# Patient Record
Sex: FEMALE | Race: WHITE | NOT HISPANIC OR LATINO | Employment: UNEMPLOYED | URBAN - NONMETROPOLITAN AREA
[De-identification: names, ages, dates, MRNs, and addresses within clinical notes are randomized per-mention and may not be internally consistent; named-entity substitution may affect disease eponyms.]

---

## 2022-06-29 ENCOUNTER — TRANSCRIBE ORDERS (OUTPATIENT)
Dept: PHYSICAL THERAPY | Facility: HOSPITAL | Age: 32
End: 2022-06-29

## 2022-06-29 DIAGNOSIS — M54.9 BACK PAIN, UNSPECIFIED BACK LOCATION, UNSPECIFIED BACK PAIN LATERALITY, UNSPECIFIED CHRONICITY: Primary | ICD-10-CM

## 2022-07-13 ENCOUNTER — HOSPITAL ENCOUNTER (OUTPATIENT)
Dept: PHYSICAL THERAPY | Facility: HOSPITAL | Age: 32
Setting detail: THERAPIES SERIES
Discharge: HOME | End: 2022-07-13

## 2022-07-13 DIAGNOSIS — M54.50 LUMBAR BACK PAIN: Primary | ICD-10-CM

## 2022-07-13 DIAGNOSIS — G89.29 CHRONIC LOW BACK PAIN WITHOUT SCIATICA, UNSPECIFIED BACK PAIN LATERALITY: ICD-10-CM

## 2022-07-13 DIAGNOSIS — M54.50 CHRONIC LOW BACK PAIN WITHOUT SCIATICA, UNSPECIFIED BACK PAIN LATERALITY: ICD-10-CM

## 2022-07-13 PROCEDURE — L3332 SHOE LIFTS TAPERED TO ONE-HA: HCPCS | Performed by: PHYSICAL THERAPIST

## 2022-07-13 PROCEDURE — 97162 PT EVAL MOD COMPLEX 30 MIN: CPT | Performed by: PHYSICAL THERAPIST

## 2022-07-13 NOTE — THERAPY EVALUATION
"    Outpatient Physical Therapy Ortho Initial Evaluation  [facility]     Patient Name: Mela Rodrigues  : 1990  MRN: [id number]  Today's Date: 2022      Visit Date: 2022    Patient seen for 1 PT sessions.  Patient reports N/A% of improvement.  Next MD appt: GABE  Recertification: 2022.    Therapy Diagnosis: Chronic LBP         Past Medical History:   Diagnosis Date   • Anxiety    • Asthma    • Diabetes mellitus (HCC)    • Hypertension         History reviewed. No pertinent surgical history.    Visit Dx:     ICD-10-CM ICD-9-CM   1. Lumbar back pain  M54.50 724.2   2. Chronic low back pain without sciatica, unspecified back pain laterality  M54.50 724.2    G89.29 338.29          Patient History     Row Name 22 0800             History    Chief Complaint Pain  -AJ      Type of Pain Back pain  -AJ      Date Current Problem(s) Began --  Chronic, years  -AJ      Brief Description of Current Complaint Patient reprots she saw a neurologist and had an MRI. She reprots she was told she has DD and 2 discs are \"completely destroyed\". Patient reprots her back has been worsening. She reprots the neurologist was recommendeding surgery but insurance won't approve until she has had PT.  -AJ      Previous treatment for THIS PROBLEM Injections  -AJ      Patient/Caregiver Goals Relieve pain;Know what to do to help the symptoms  -AJ      Current Tobacco Use None  -AJ      Smoking Status Former smoker  -AJ      Patient's Rating of General Health Fair  -AJ      Occupation/sports/leisure activities Occupation:unemployed; Hobbies: hiking, fishing, animals  -AJ      Patient seeing anyone else for problem(s)? Neurologist, Ortho  -AJ      What clinical tests have you had for this problem? MRI;X-ray  -AJ      History of Previous Related Injuries None that patient can recall.  -AJ              Pain     Pain Location Back  -AJ      Pain at Present 4  -AJ      Pain at Best 2  -AJ      Pain at Worst 9  -AJ      " Pain Frequency Constant/continuous  -AJ      Pain Description Throbbing;Sharp;Burning;Aching;Shooting  -AJ      What Performance Factors Make the Current Problem(s) WORSE? prolonged positions  -AJ      What Performance Factors Make the Current Problem(s) BETTER? muscle relaxants  -AJ      Tolerance Time- Standing 10 minutes  -AJ      Tolerance Time- Sitting 30 minutes  -AJ      Is your sleep disturbed? Yes  sometimes  -AJ      Is medication used to assist with sleep? Yes  muscle relaxant  -AJ            User Key  (r) = Recorded By, (t) = Taken By, (c) = Cosigned By    Initials Name Provider Type    AJ Concepcion Peña, PT DPT Physical Therapist                 PT Ortho     Row Name 07/13/22 0800       Subjective Comments    Subjective Comments see history  -AJ       Subjective Pain    Able to rate subjective pain? yes  -AJ    Pre-Treatment Pain Level 4  -AJ    Post-Treatment Pain Level 4  -AJ       Posture/Observations    Alignment Options Forward head;Cervical lordosis;Thoracic kyphosis;Rounded shoulders;Scoliosis;Lumbar lordosis;Iliac crests  -AJ    Forward Head Mild;Moderate;Increased  -AJ    Cervical Lordosis Mild;Moderate;Increased  -AJ    Thoracic Kyphosis Mild;Moderate;Increased  -AJ    Rounded Shoulders Bilateral:;Mild;Moderate;Increased  -AJ    Scoliosis Normal  -AJ    Lumbar lordosis Bilateral:;Mild;Decreased  flattening  -AJ    Iliac crests Bilateral:;Normal  pelvis is level, but LLD discovered with R LE being 1cm short.  -AJ    Posture/Observations Comments No distress. very poor overall standing and sitting posture. Locks knees into hyperextension in standing.  -AJ       Sensory Screen for Light Touch- Lower Quarter Clearing    L1 (inguinal area) Bilateral:;Intact  -AJ    L2 (anterior mid thigh) Bilateral:;Intact  -AJ    L3 (distal anterior thigh) Bilateral:;Intact  -AJ    L4 (medial lower leg/foot) Bilateral:;Intact  -AJ    L5 (lateral lower leg/great toe) Bilateral:;Intact  -AJ    S1 (bottom of  foot) Bilateral:;Intact  -AJ       Lumbar/SI Special Tests    Standing Flexion Test (SI Dysfunction) Bilateral:;Negative  -AJ    Stork Test (SI Dysfunction) Bilateral:;Negative  -AJ    Trendelenburg Test (Gluteus Medius Weakness) Bilateral:;Negative  -AJ    Slump Test (Neural Tension) Bilateral:;Negative  -AJ    SLR (Neural Tension) Bilateral:;Negative  -AJ    SI Compression Test (SI Dysfunction) Bilateral:;Negative  -AJ    SI Distraction Test (SI Dysfunction) Bilateral:;Negative  -AJ    BRYANT (hip vs. SI Dysfunction) Bilateral:;Negative  -AJ    FAIR Test (Piriformis Syndrome) Bilateral:;Negative  -AJ       Millicent's Signs    Superficial and non-anatomical tenderness Negative  -AJ    Simulation test Positive  -AJ    Distraction straight leg raise test (sitting vs supine) Positive  -AJ    Regional disturbances Positive  -AJ    Overreaction to examination Negative  -AJ       Head/Neck/Trunk    Trunk Extension AROM 8°  -AJ    Trunk Flexion AROM 65°  -AJ    Trunk Lt Lateral Flexion AROM 100% of range, WNL  -AJ    Trunk Rt Lateral Flexion AROM 100% of range, WNL  -AJ    Trunk Lt Rotation AROM 50% of range  -AJ    Trunk Rt Rotation AROM 50% of range  -AJ       MMT (Manual Muscle Testing)    General MMT Comments B LE 5/5, except B hip flexion 4/5, cogwheeling with R hip flexion and R QS testing  -AJ       Sensation    Sensation WNL? WNL  -AJ    Light Touch No apparent deficits  -AJ    Additional Comments Denies any numbness or tingling  -AJ       Lower Extremity Flexibility    Hamstrings Bilateral:;Moderately limited  -AJ    Hip Flexors Bilateral:;Mildly limited  -AJ    LE Other Flexibility Bilateral:;Mildly limited  piriformis  -AJ       Pathomechanics    Spine Pathomechanics Excessive thoracic kyphosis with forward bend;Limited lumbar flattening with forward bend  -AJ       Transfers    Comment, (Transfers) I with all transfers, but absent log roll technique.  -AJ       Gait/Stairs (Locomotion)    Comment, (Gait/Stairs)  "FWB, non-antalgic gait, no assistive device, no significant deviations noted, normal arm swing with gait.  -          User Key  (r) = Recorded By, (t) = Taken By, (c) = Cosigned By    Initials Name Provider Type    Concepcion Carpio, PT DPT Physical Therapist                            Therapy Education  Education Details: HEP: all exercises given today  Given: HEP, Symptoms/condition management, Pain management, Posture/body mechanics, Mobility training (POC)  Program: New  How Provided: Verbal, Demonstration, Written  Provided to: Patient  Level of Understanding: Demonstrated, Verbalized, Teach back education performed      PT OP Goals     Row Name 07/13/22 0800          PT Short Term Goals    STG 1 I with HEP and have additions/changes by next recertification  -     STG 2 Patient able to show proper log roll technique.  -     STG 3 Patient to be more aware of posture and posture correction techniques  -     STG 4 AROM B Lumbar ROT >= 75% of range  -     STG 5 AROM lumbar extension >= 20°.  -     STG 6 B hip flexion >= 4+/5  -            Long Term Goals    LTG 1 Patient to have AROM for the lumbar spine all WNL, no increase in pain.  -     LTG 2 B LE 5/5 with no increase in pain.  -     LTG 3 Patient able to perform 20 Bridges with UE \"X\" over Pball with no increase in pain.  -     LTG 4 Patient able to control hyperextension present in B knees  -     LTG 5 Patient able to show proper lifting technique floor to waist with no increase in pain.  -     LTG 6 Patient able to show proper ergonomics for mopping/sweeping/vacuuming.  -     LTG 7 I with final HEP.  -            Time Calculation    PT Goal Re-Cert Due Date 08/03/22  -           User Key  (r) = Recorded By, (t) = Taken By, (c) = Cosigned By    Initials Name Provider Type    Concepcion Carpio, PT DPT Physical Therapist              Barriers to Rehab: Include significant or possible arthritic/degenerative changes " that have occurred within the spine, The chronicity of this issue, The patient's obesity.    Safety Issues: None noted.        PT Assessment/Plan     Row Name 07/13/22 0800          PT Assessment    Functional Limitations Limitation in home management;Limitations in community activities;Performance in leisure activities;Performance in self-care ADL  -AJ     Impairments Endurance;Impaired flexibility;Impaired muscle endurance;Impaired muscle length;Impaired muscle power;Impaired postural alignment;Joint integrity;Joint mobility;Muscle strength;Pain;Poor body mechanics;Posture;Range of motion  -     Assessment Comments Patient is an overweight 32yo female who presents to the clinci today with complaints of low back pain worsening over the yeats. She reports surgery was recommended due to DDD but Pt was required first. She has a LLD that was discovred as well as significant tightness in trunk and LEs with B knee hyperextension. She was fit with a heel wedge for LLD. Patient's insurance does not allow treatment to begin on the initial evaluation. Small HEP was established.    Skilled PT with address deficits listed.  -AJ     Please refer to paper survey for additional self-reported information Yes  -AJ     Rehab Potential Fair  -AJ     Patient/caregiver participated in establishment of treatment plan and goals Yes  -AJ     Patient would benefit from skilled therapy intervention Yes  -AJ            PT Plan    PT Frequency 1x/week  per patient request  -AJ     Predicted Duration of Therapy Intervention (PT) 6-8 visits  -AJ     Planned CPT's? PT EVAL MOD COMPLELITY: 57710;PT RE-EVAL: 79326;PT THER PROC EA 15 MIN: 42840;PT THER ACT EA 15 MIN: 34968;PT MANUAL THERAPY EA 15 MIN: 53782;PT NEUROMUSC RE-EDUCATION EA 15 MIN: 70159;PT SELF CARE/HOME MGMT/TRAIN EA 15: 61328;PT HOT OR COLD PACK TREAT [facility];PT GAIT TRAINING EA 15 MIN: 90832;PT THER SUPP EA 15 MIN  -AJ     PT Plan Comments progress overall ROM, strength, posture,  "core stab, endurance, spinal protection, and ergonomics training.  -           User Key  (r) = Recorded By, (t) = Taken By, (c) = Cosigned By    Initials Name Provider Type    Concepcion Carpio PT DPT Physical Therapist            Other therapeutic activities and/or exercises will be prescribed depending on the patient's progress or lack thereof.         OP Exercises     Row Name 07/13/22 0800             Subjective Comments    Subjective Comments see history  -AJ              Subjective Pain    Able to rate subjective pain? yes  -AJ      Pre-Treatment Pain Level 4  -AJ      Post-Treatment Pain Level 4  -AJ              Exercise 1    Exercise Name 1 Fit with heel lift for LLD  -      Additional Comments Size medium  -              Exercise 2    Exercise Name 2 B St. HS S  -AJ      Reps 2 2  -AJ      Time 2 30 seconds  -AJ              Exercise 3    Exercise Name 3 LTR  -AJ      Reps 3 10  -AJ      Time 3 10\" hold  -            User Key  (r) = Recorded By, (t) = Taken By, (c) = Cosigned By    Initials Name Provider Type    Concepcion Carpio PT DPT Physical Therapist            All therapeutic interventions performed today were to address current functional limitations and/or deficits in addressing all physical therapy goals.                    Outcome Measure Options: Modified Oswestry  Modified Oswestry  Modified Oswestry Score/Comments: 19/50 = 38%      Time Calculation:     Start Time: 0820  Stop Time: 0900  Time Calculation (min): 40 min     Therapy Charges for Today     Code Description Service Date Service Provider Modifiers Qty    53384376469  PT THER SUPP EA 15 MIN 7/13/2022 Concepcion Peña, PT DPT GP 1    [id number]  PT EVAL MOD COMPLEXITY 3 7/13/2022 Concepcion Peña PT DPT GP 1    [id number]  LIFT HEEL ADJUST A LIFT 7/13/2022 Concepcion Peña PT DPT  1          PT G-Codes  Outcome Measure Options: Modified Oswestry  Modified Oswestry Score/Comments: 19/50 = " 38%       This document has been electronically signed by Concepcion Peña, PT DPT, CSCS on July 13, 2022 09:22 CDT

## 2022-07-20 ENCOUNTER — HOSPITAL ENCOUNTER (OUTPATIENT)
Dept: PHYSICAL THERAPY | Facility: HOSPITAL | Age: 32
Setting detail: THERAPIES SERIES
Discharge: HOME | End: 2022-07-20

## 2022-07-20 DIAGNOSIS — M54.50 LUMBAR BACK PAIN: Primary | ICD-10-CM

## 2022-07-20 DIAGNOSIS — M54.50 CHRONIC LOW BACK PAIN WITHOUT SCIATICA, UNSPECIFIED BACK PAIN LATERALITY: ICD-10-CM

## 2022-07-20 DIAGNOSIS — G89.29 CHRONIC LOW BACK PAIN WITHOUT SCIATICA, UNSPECIFIED BACK PAIN LATERALITY: ICD-10-CM

## 2022-07-20 PROCEDURE — 97110 THERAPEUTIC EXERCISES: CPT | Performed by: PHYSICAL THERAPIST

## 2022-07-20 NOTE — THERAPY TREATMENT NOTE
Outpatient Physical Therapy Ortho Treatment Note  [facility]     Patient Name: Mela Rodrigues  : 1990  MRN: [id number]  Today's Date: 2022      Visit Date: 2022     Patient seen for 2 PT sessions.  Patient reports N/A% of improvement.  Next MD appt: GABE  Recertification: 2022.     Therapy Diagnosis: Chronic LBP    Visit Dx:    ICD-10-CM ICD-9-CM   1. Lumbar back pain  M54.50 724.2   2. Chronic low back pain without sciatica, unspecified back pain laterality  M54.50 724.2    G89.29 338.29       There is no problem list on file for this patient.       Past Medical History:   Diagnosis Date   • Anxiety    • Asthma    • Diabetes mellitus (HCC)    • Hypertension         No past surgical history on file.     PT Ortho     Row Name 22 0800       Precautions and Contraindications    Precautions/Limitations no known precautions/limitations  -KG       Subjective Pain    Able to rate subjective pain? yes  -KG       Posture/Observations    Posture/Observations Comments No malalignment at pelvis. Cues/education for posture throughout.  -KG          User Key  (r) = Recorded By, (t) = Taken By, (c) = Cosigned By    Initials Name Provider Type    Meenu Naik, PT Physical Therapist                             PT Assessment/Plan     Row Name 22 08          PT Assessment    Assessment Comments Pt did reasonably well with treatment. Good recall of HEP. Notes that the heel lift given to her at initial eval has helped with her overall pain. Did well with new stretches. Needed cues for proper iso TA contraction but improved with increased reps/cues.  -KG            PT Plan    PT Frequency 1x/week  -KG     PT Plan Comments Continue stability progressions. Could try seated PN/posture education. seated no moneys next.  -KG           User Key  (r) = Recorded By, (t) = Taken By, (c) = Cosigned By    Initials Name Provider Type    Meenu Naik, PT Physical Therapist           "         OP Exercises     Row Name 07/20/22 0800             Subjective Comments    Subjective Comments Pt states the exercises are going fair. Has a little pain when she does them. Pt states the heel lift has helped the pain in her foot and back.  -KG              Subjective Pain    Able to rate subjective pain? yes  -KG      Pre-Treatment Pain Level 5  -KG      Post-Treatment Pain Level 5  -KG              Exercise 1    Exercise Name 1 Seated jim hamstring stretch  -KG      Cueing 1 Verbal  -KG      Reps 1 2  -KG      Time 1 30\" hold  -KG              Exercise 2    Exercise Name 2 Seated jim piriformis stretch  -KG      Cueing 2 Verbal  -KG      Reps 2 2  -KG      Time 2 30\" hold  -KG              Exercise 3    Exercise Name 3 Jim SKTC stretch  -KG      Cueing 3 Verbal  -KG      Reps 3 2  -KG      Time 3 30\" hold  -KG              Exercise 4    Exercise Name 4 HL jim piriformis stretch  -KG      Cueing 4 Verbal  -KG      Reps 4 2  -KG      Time 4 30\" hold  -KG              Exercise 5    Exercise Name 5 HL isometric TA/kegel education  -KG      Cueing 5 Verbal;Tactile  -KG      Time 5 3 min  -KG              Exercise 6    Exercise Name 6 HL iso TA wtih add squeeze  -KG      Cueing 6 Verbal  -KG      Sets 6 1  -KG      Reps 6 10  -KG      Time 6 5\" hold  -KG              Exercise 7    Exercise Name 7 HL hip abd with TA  -KG      Cueing 7 Verbal  -KG      Sets 7 2  -KG      Reps 7 10  -KG      Time 7 pause  -KG              Exercise 8    Exercise Name 8 Bridges with TA  -KG      Cueing 8 Verbal  -KG      Sets 8 1  -KG      Reps 8 10  -KG      Time 8 5\" hold  -KG              Exercise 9    Exercise Name 9 SL clamshells with TA  -KG      Cueing 9 Verbal  -KG      Sets 9 1  -KG      Reps 9 10 ea LE  -KG            User Key  (r) = Recorded By, (t) = Taken By, (c) = Cosigned By    Initials Name Provider Type    KG Meenu Matias, PT Physical Therapist                              PT OP Goals     Row Name 07/20/22 0800 " "         PT Short Term Goals    STG 1 I with HEP and have additions/changes by next recertification  -KG     STG 1 Progress Progressing  -KG     STG 2 Patient able to show proper log roll technique.  -KG     STG 2 Progress Progressing  -KG     STG 3 Patient to be more aware of posture and posture correction techniques  -KG     STG 3 Progress Progressing  -KG     STG 4 AROM B Lumbar ROT >= 75% of range  -KG     STG 4 Progress Progressing  -KG     STG 5 AROM lumbar extension >= 20°.  -KG     STG 5 Progress Progressing  -KG     STG 6 B hip flexion >= 4+/5  -KG     STG 6 Progress Progressing  -KG            Long Term Goals    LTG 1 Patient to have AROM for the lumbar spine all WNL, no increase in pain.  -KG     LTG 2 B LE 5/5 with no increase in pain.  -KG     LTG 3 Patient able to perform 20 Bridges with UE \"X\" over Pball with no increase in pain.  -KG     LTG 4 Patient able to control hyperextension present in B knees  -KG     LTG 5 Patient able to show proper lifting technique floor to waist with no increase in pain.  -KG     LTG 6 Patient able to show proper ergonomics for mopping/sweeping/vacuuming.  -KG     LTG 7 I with final HEP.  -KG            Time Calculation    PT Goal Re-Cert Due Date 08/03/22  -KG           User Key  (r) = Recorded By, (t) = Taken By, (c) = Cosigned By    Initials Name Provider Type    Meenu Naik, PT Physical Therapist                Therapy Education  Education Details: Added SKTC stretch, HL/seated piriformis stretch, & Iso TA/kegel  Given: HEP, Symptoms/condition management, Pain management, Posture/body mechanics  Program: Reinforced, Progressed  How Provided: Verbal, Demonstration, Written  Provided to: Patient  Level of Understanding: Teach back education performed, Verbalized, Demonstrated              Time Calculation:   Start Time: 0830  Stop Time: 0914  Time Calculation (min): 44 min  Therapy Charges for Today     Code Description Service Date Service Provider Modifiers " Qty    [id number]  PT THER PROC EA 15 MIN 7/20/2022 Meenu Matias, PT GP 3                    Meenu Matias, PT  7/20/2022

## 2022-07-27 ENCOUNTER — HOSPITAL ENCOUNTER (OUTPATIENT)
Dept: PHYSICAL THERAPY | Facility: HOSPITAL | Age: 32
Setting detail: THERAPIES SERIES
Discharge: HOME | End: 2022-07-27

## 2022-07-27 DIAGNOSIS — M54.50 CHRONIC LOW BACK PAIN WITHOUT SCIATICA, UNSPECIFIED BACK PAIN LATERALITY: ICD-10-CM

## 2022-07-27 DIAGNOSIS — M54.50 LUMBAR BACK PAIN: Primary | ICD-10-CM

## 2022-07-27 DIAGNOSIS — G89.29 CHRONIC LOW BACK PAIN WITHOUT SCIATICA, UNSPECIFIED BACK PAIN LATERALITY: ICD-10-CM

## 2022-07-27 PROCEDURE — 97110 THERAPEUTIC EXERCISES: CPT

## 2022-07-27 NOTE — THERAPY TREATMENT NOTE
Outpatient Physical Therapy Ortho Treatment Note  [facility]     Patient Name: Mela Rodrigues  : 1990  MRN: [id number]  Today's Date: 2022     Pt seen for 3 PT sessions  Reported Improvement:  N/A %  MD Visit: PRN  Recheck Date: 2022    Therapy Diagnosis:  Chronic LBP        Visit Date: 2022    Visit Dx:    ICD-10-CM ICD-9-CM   1. Lumbar back pain  M54.50 724.2   2. Chronic low back pain without sciatica, unspecified back pain laterality  M54.50 724.2    G89.29 338.29       There is no problem list on file for this patient.       Past Medical History:   Diagnosis Date   • Anxiety    • Asthma    • Diabetes mellitus (HCC)    • Hypertension         No past surgical history on file.     PT Ortho     Row Name 22 08       Subjective Comments    Subjective Comments States her back has been giving her a lot of trouble this week.  Been doing some of the HEP.  MM relaxers and Tylenol help the most.  -GARDENIA       Precautions and Contraindications    Precautions/Limitations no known precautions/limitations  -GARDENIA       Subjective Pain    Able to rate subjective pain? yes  -GARDENIA    Pre-Treatment Pain Level 3  -GARDENIA          User Key  (r) = Recorded By, (t) = Taken By, (c) = Cosigned By    Initials Name Provider Type    Rosemary Alston PTA Physical Therapist Assistant                             PT Assessment/Plan     Row Name 22 08          PT Assessment    Assessment Comments Pt with fair recall of HEP.  Min cues for posture at times but was able to self correct in sitting.  No reports of increased pain during tx session.  -            PT Plan    PT Frequency 1x/week  -     PT Plan Comments Next visit add seated PN hip flexion/LAQ  -GARDENIA           User Key  (r) = Recorded By, (t) = Taken By, (c) = Cosigned By    Initials Name Provider Type    Rosemary Alston PTA Physical Therapist Assistant                   OP Exercises     Row Name 22 08             Subjective Comments  "   Subjective Comments States her back has been giving her a lot of trouble this week.  Been doing some of the HEP.  MM relaxers and Tylenol help the most.  -JW              Subjective Pain    Able to rate subjective pain? yes  -JW      Pre-Treatment Pain Level 3  -JW      Post-Treatment Pain Level 3  -JW              Exercise 1    Exercise Name 1 Pro II LE bike for posture re ed, strength and endurance  -JW      Time 1 10 min  -JW      Additional Comments L 4.0  -JW              Exercise 2    Exercise Name 2 B st HS st  -JW      Reps 2 2  -JW      Time 2 30\"  -JW              Exercise 3    Exercise Name 3 B seated piriformis st  -JW      Reps 3 2  -JW      Time 3 30\" hold  -JW              Exercise 4    Exercise Name 4 Sit to stands with Lx  -JW      Cueing 4 Verbal;Demo  -JW      Reps 4 10  -JW      Time 4 5\" hold  -JW              Exercise 5    Exercise Name 5 Seated PN No Money's  -JW      Sets 5 2  -JW      Reps 5 10  -JW      Time 5 5\" hold  -JW      Additional Comments YTB  -JW              Exercise 6    Exercise Name 6 SKTC st  -JW      Sets 6 2  -JW      Reps 6 30\" hold bilat  -JW              Exercise 7    Exercise Name 7 HL Hip ISO Hip ADD with TA  -JW      Cueing 7 Verbal  -JW      Reps 7 20  -JW      Time 7 5\" hold  -JW              Exercise 8    Exercise Name 8 HL Hip ABD with TA  -JW      Reps 8 20  -JW      Time 8 5\" hold  -JW      Additional Comments RTB  -JW              Exercise 9    Exercise Name 9 BKLL with TA  -JW              Exercise 10    Exercise Name 10 Bridges with TA  -JW      Sets 10 1  -JW      Reps 10 10  -JW      Time 10 5\" hold  -JW              Exercise 11    Exercise Name 11 Log roll training  -JW            User Key  (r) = Recorded By, (t) = Taken By, (c) = Cosigned By    Initials Name Provider Type    Rosemary Alston, PTA Physical Therapist Assistant                              PT OP Goals     Row Name 07/27/22 0800          PT Short Term Goals    STG 1 I with HEP and have " "additions/changes by next recertification  -     STG 1 Progress Progressing  -     STG 2 Patient able to show proper log roll technique.  -     STG 2 Progress Progressing  -     STG 3 Patient to be more aware of posture and posture correction techniques  -     STG 3 Progress Progressing  -     STG 4 AROM B Lumbar ROT >= 75% of range  -     STG 4 Progress Progressing  -     STG 5 AROM lumbar extension >= 20°.  -     STG 5 Progress Progressing  -     STG 6 B hip flexion >= 4+/5  -     STG 6 Progress Progressing  -            Long Term Goals    LTG 1 Patient to have AROM for the lumbar spine all WNL, no increase in pain.  -     LTG 2 B LE 5/5 with no increase in pain.  -     LTG 3 Patient able to perform 20 Bridges with UE \"X\" over Pball with no increase in pain.  -     LTG 4 Patient able to control hyperextension present in B knees  -     LTG 5 Patient able to show proper lifting technique floor to waist with no increase in pain.  -     LTG 6 Patient able to show proper ergonomics for mopping/sweeping/vacuuming.  -     LTG 7 I with final HEP.  -            Time Calculation    PT Goal Re-Cert Due Date 08/03/22  -           User Key  (r) = Recorded By, (t) = Taken By, (c) = Cosigned By    Initials Name Provider Type    Rosemary Alston, YAMEL Physical Therapist Assistant                Therapy Education  Education Details: Yellow Tband, No money's, Hip ABD/ADD, red tband  Given: HEP, Symptoms/condition management, Posture/body mechanics, Pain management  Program: New, Reinforced  How Provided: Verbal, Demonstration, Written  Provided to: Patient  Level of Understanding: Teach back education performed, Verbalized, Demonstrated              Time Calculation:   Start Time: 0830  Stop Time: 0918  Time Calculation (min): 48 min  Therapy Charges for Today     Code Description Service Date Service Provider Modifiers Qty    48531790871 HC PT THER PROC EA 15 MIN 7/27/2022 Rosemary Delatorre, " PTA GP, CQ 3    [id number]  PT THER SUPP EA 15 MIN 7/27/2022 Rosemary Delatorre PTA GP, CQ 1                    Rosemary Delatorre PTA  7/27/2022

## 2022-08-04 ENCOUNTER — HOSPITAL ENCOUNTER (OUTPATIENT)
Dept: PHYSICAL THERAPY | Facility: HOSPITAL | Age: 32
Setting detail: THERAPIES SERIES
Discharge: HOME | End: 2022-08-04

## 2022-08-04 DIAGNOSIS — M54.50 CHRONIC LOW BACK PAIN WITHOUT SCIATICA, UNSPECIFIED BACK PAIN LATERALITY: ICD-10-CM

## 2022-08-04 DIAGNOSIS — G89.29 CHRONIC LOW BACK PAIN WITHOUT SCIATICA, UNSPECIFIED BACK PAIN LATERALITY: ICD-10-CM

## 2022-08-04 DIAGNOSIS — M54.50 LUMBAR BACK PAIN: Primary | ICD-10-CM

## 2022-08-04 PROCEDURE — 97530 THERAPEUTIC ACTIVITIES: CPT | Performed by: PHYSICAL THERAPIST

## 2022-08-04 PROCEDURE — 97110 THERAPEUTIC EXERCISES: CPT | Performed by: PHYSICAL THERAPIST

## 2022-08-04 NOTE — THERAPY PROGRESS REPORT/RE-CERT
"    Outpatient Physical Therapy Ortho Progress Note  [facility]     Patient Name: Mela Rodrigues  : 1990  MRN: [id number]  Today's Date: 2022      Visit Date: 2022    Patient seen for 4 PT sessions.  Patient reports \"a little\"% of improvement.  Next MD appt: PRN.  Recertification: N/A.    Therapy Diagnosis: Chronic LBP           Past Medical History:   Diagnosis Date   • Anxiety    • Asthma    • Diabetes mellitus (HCC)    • Hypertension         No past surgical history on file.    Visit Dx:     ICD-10-CM ICD-9-CM   1. Lumbar back pain  M54.50 724.2   2. Chronic low back pain without sciatica, unspecified back pain laterality  M54.50 724.2    G89.29 338.29              PT Ortho     Row Name 22 1300       Subjective Comments    Subjective Comments patient reports she feels a little more flexibile  -       Precautions and Contraindications    Precautions/Limitations no known precautions/limitations  -       Subjective Pain    Able to rate subjective pain? yes  -AJ    Pre-Treatment Pain Level 5  -    Post-Treatment Pain Level --  4-5/10  -       Posture/Observations    Alignment Options Forward head;Cervical lordosis;Thoracic kyphosis;Rounded shoulders;Scoliosis;Lumbar lordosis;Iliac crests  -AJ    Forward Head Mild;Moderate;Increased  -AJ    Cervical Lordosis Mild;Moderate;Increased  -AJ    Thoracic Kyphosis Mild;Moderate;Increased  -AJ    Rounded Shoulders Bilateral:;Mild;Moderate;Increased  -AJ    Scoliosis Normal  -AJ    Lumbar lordosis Bilateral:;Mild;Decreased  -AJ    Iliac crests Bilateral:;Normal  Pelvis is level no LLD ot note  -AJ    Posture/Observations Comments No distress.  -       Sensory Screen for Light Touch- Lower Quarter Clearing    L1 (inguinal area) Bilateral:;Intact  -AJ    L2 (anterior mid thigh) Bilateral:;Intact  -AJ    L3 (distal anterior thigh) Bilateral:;Intact  -AJ    L4 (medial lower leg/foot) Bilateral:;Intact  -AJ    L5 (lateral lower leg/great toe) " Bilateral:;Intact  -AJ    S1 (bottom of foot) Bilateral:;Intact  -AJ       Millicent's Signs    Regional disturbances Positive  -AJ       Head/Neck/Trunk    Trunk Extension AROM 20°  -AJ    Trunk Flexion AROM 70°  -AJ    Trunk Lt Lateral Flexion AROM 100% of range, WNL  -AJ    Trunk Rt Lateral Flexion AROM 100% of range, WNL  -AJ    Trunk Lt Rotation AROM 75% of range  -AJ    Trunk Rt Rotation AROM 75% of range  -AJ       MMT (Manual Muscle Testing)    General MMT Comments B LE 5/5, except B hip flexion 4-/5, cogwheeling with B hip flexion and R QS testing  -AJ       Sensation    Sensation WNL? WNL  -AJ    Light Touch No apparent deficits  -AJ    Additional Comments Denies any numbness or tingling  -AJ       Lower Extremity Flexibility    Hamstrings Bilateral:;Moderately limited  -AJ    Hip Flexors Bilateral:;Mildly limited  -AJ    LE Other Flexibility Bilateral:;Mildly limited  piriformis  -AJ       Pathomechanics    Spine Pathomechanics Excessive thoracic kyphosis with forward bend;Limited lumbar flattening with forward bend  -AJ       Transfers    Comment, (Transfers) I with all transfrs, mild cueing for log roll technique still.  -AJ       Gait/Stairs (Locomotion)    Bilateral Gait Deviations knee hyperextension  -AJ    Comment, (Gait/Stairs) FWB, non-antalgic gait, no assistive device, no significant deviations noted, normal arm swing with gait.  -AJ          User Key  (r) = Recorded By, (t) = Taken By, (c) = Cosigned By    Initials Name Provider Type    AJ Concepcion Peña, PT DPT Physical Therapist                            Therapy Education  Given: HEP, Symptoms/condition management, Posture/body mechanics, Other (comment) (POC)  Program: Reinforced  How Provided: Verbal  Provided to: Patient  Level of Understanding: Verbalized      PT OP Goals     Row Name 08/04/22 1300          PT Short Term Goals    STG 1 I with HEP and have additions/changes by next recertification  -AJ     STG 1 Progress Met  -AJ   "   STG 2 Patient able to show proper log roll technique.  -     STG 2 Progress Progressing  -     STG 3 Patient to be more aware of posture and posture correction techniques  -     STG 3 Progress Met  -     STG 4 AROM B Lumbar ROT >= 75% of range  -     STG 4 Progress Met  -     STG 5 AROM lumbar extension >= 20°.  -     STG 5 Progress Met  -     STG 6 B hip flexion >= 4+/5  -     STG 6 Progress Not Met  -            Long Term Goals    LTG 1 Patient to have AROM for the lumbar spine all WNL, no increase in pain.  -     LTG 1 Progress Partially Met  -     LTG 2 B LE 5/5 with no increase in pain.  -     LTG 2 Progress Partially Met  -     LTG 3 Patient able to perform 20 Bridges with UE \"X\" over Pball with no increase in pain.  -     LTG 3 Progress Not Met  -     LTG 4 Patient able to control hyperextension present in B knees  -     LTG 4 Progress Partially Met;Ongoing  -     LTG 5 Patient able to show proper lifting technique floor to waist with no increase in pain.  -     LTG 5 Progress Ongoing  -     LTG 6 Patient able to show proper ergonomics for mopping/sweeping/vacuuming.  -     LTG 6 Progress Ongoing  -     LTG 7 I with final HEP.  -            Time Calculation    PT Goal Re-Cert Due Date --  N/A  -           User Key  (r) = Recorded By, (t) = Taken By, (c) = Cosigned By    Initials Name Provider Type    Concepcion Carpio, PT DPT Physical Therapist              Barriers to Rehab: Include significant or possible arthritic/degenerative changes that have occurred within the spine, The chronicity of this issue, The patient's obesity.     Safety Issues: None noted.      PT Assessment/Plan     Row Name 08/04/22 1300          PT Assessment    Functional Limitations Limitation in home management;Limitations in community activities;Performance in leisure activities;Performance in self-care ADL  -     Impairments Endurance;Impaired flexibility;Impaired muscle " endurance;Impaired muscle length;Impaired muscle power;Impaired postural alignment;Joint integrity;Joint mobility;Muscle strength;Pain;Poor body mechanics;Posture;Range of motion  -AJ     Assessment Comments Patient has made some mild improvements with lflexibility and ROM. Still struggles with endurance and strength exercises.  -AJ     Please refer to paper survey for additional self-reported information Yes  -AJ     Rehab Potential Fair  -AJ     Patient/caregiver participated in establishment of treatment plan and goals Yes  -AJ     Patient would benefit from skilled therapy intervention No  -AJ            PT Plan    PT Frequency 1x/week  -AJ     Predicted Duration of Therapy Intervention (PT) 2 more visits  -AJ     PT Plan Comments 2 more visits and then D/C to an I program.  -AJ           User Key  (r) = Recorded By, (t) = Taken By, (c) = Cosigned By    Initials Name Provider Type    Concepcion Carpio, PT DPT Physical Therapist            Other therapeutic activities and/or exercises will be prescribed depending on the patient's progress or lack thereof.       Modalities     Row Name 08/04/22 1300             Ice    Patient denies application of Ice Yes  -AJ            User Key  (r) = Recorded By, (t) = Taken By, (c) = Cosigned By    Initials Name Provider Type    Concepcion Carpio, PT DPT Physical Therapist               OP Exercises     Row Name 08/04/22 1300             Subjective Comments    Subjective Comments patient reports she feels a little more flexibile  -AJ              Subjective Pain    Able to rate subjective pain? yes  -AJ      Pre-Treatment Pain Level 5  -AJ      Post-Treatment Pain Level --  4-5/10  -AJ              Exercise 1    Exercise Name 1 Pro II LE bike for posture re ed, strength and endurance  -AJ      Time 1 10 minutes  -AJ      Additional Comments L 6.0  -AJ              Exercise 2    Exercise Name 2 B St. HS S  -AJ      Reps 2 2  -AJ      Time 2 30 seconds  -AJ         "      Exercise 3    Exercise Name 3 B sitting piriformis S  -AJ      Reps 3 2  -AJ      Time 3 30 seconds  -AJ              Exercise 4    Exercise Name 4 measurements  -AJ              Exercise 5    Exercise Name 5 Bridges  -AJ      Reps 5 20  -AJ      Time 5 5\" hold  -AJ              Exercise 6    Exercise Name 6 B SKTC S  -AJ      Reps 6 2  -AJ      Time 6 30 seconds  -AJ              Exercise 7    Exercise Name 7 DKTC with Pball  -AJ      Reps 7 20  -AJ      Time 7 5\" hold  -AJ              Exercise 8    Exercise Name 8 LTR  -AJ      Reps 8 10  -AJ      Time 8 10\" hold  -AJ              Exercise 9    Exercise Name 9 B Fwd step up with opp hip ext  -AJ      Reps 9 15 each LE  -AJ            User Key  (r) = Recorded By, (t) = Taken By, (c) = Cosigned By    Initials Name Provider Type    Concepcion Carpio, PT ISELAT Physical Therapist            All therapeutic interventions performed today were to address current functional limitations and/or deficits in addressing all physical therapy goals.                    Outcome Measure Options: Modified Oswestry  Modified Oswestry  Modified Oswestry Score/Comments: 27/50 = 54%      Time Calculation:     Start Time: 1304  Stop Time: 1348  Time Calculation (min): 44 min  Total Timed Code Minutes- PT: 44 minute(s)     Therapy Charges for Today     Code Description Service Date Service Provider Modifiers Qty    60247546289  PT THER PROC EA 15 MIN 8/4/2022 Concepcion Peña, PT DPT GP 2    28814748986 HC PT THERAPEUTIC ACT EA 15 MIN 8/4/2022 Concepcion Peña, PT DPT GP 1    33095189560  PT THER SUPP EA 15 MIN 8/4/2022 Concepcion Peña PT DPT GP 1          PT G-Codes  Outcome Measure Options: Modified Oswestry  Modified Oswestry Score/Comments: 27/50 = 54%       This document has been electronically signed by Concepcion Peña PT DPT, [facility] on August 4, 2022 14:14 CDT      "

## 2022-08-08 ENCOUNTER — HOSPITAL ENCOUNTER (OUTPATIENT)
Dept: PHYSICAL THERAPY | Facility: HOSPITAL | Age: 32
Setting detail: THERAPIES SERIES
Discharge: HOME | End: 2022-08-08

## 2022-08-08 DIAGNOSIS — G89.29 CHRONIC LOW BACK PAIN WITHOUT SCIATICA, UNSPECIFIED BACK PAIN LATERALITY: ICD-10-CM

## 2022-08-08 DIAGNOSIS — M54.50 CHRONIC LOW BACK PAIN WITHOUT SCIATICA, UNSPECIFIED BACK PAIN LATERALITY: ICD-10-CM

## 2022-08-08 DIAGNOSIS — M54.50 LUMBAR BACK PAIN: Primary | ICD-10-CM

## 2022-08-08 PROCEDURE — 97110 THERAPEUTIC EXERCISES: CPT

## 2022-08-08 NOTE — THERAPY TREATMENT NOTE
Outpatient Physical Therapy Ortho Treatment Note  [facility]     Patient Name: Mela Rodrigues  : 1990  MRN: [id number]  Today's Date: 2022     Pt seen for 5 PT sessions  Reported Improvement:  10-15 %  MD Visit: PRN  Recheck Date: N/A    Therapy Diagnosis:  Chronic LBP        Visit Date: 2022    Visit Dx:    ICD-10-CM ICD-9-CM   1. Lumbar back pain  M54.50 724.2   2. Chronic low back pain without sciatica, unspecified back pain laterality  M54.50 724.2    G89.29 338.29       There is no problem list on file for this patient.       Past Medical History:   Diagnosis Date   • Anxiety    • Asthma    • Diabetes mellitus (HCC)    • Hypertension         No past surgical history on file.     PT Ortho     Row Name 22 0900       Subjective Comments    Subjective Comments Staets her hips feel more tight today due to a lot of walking with grocery shopping yesterday.  -GARDENIA       Precautions and Contraindications    Precautions/Limitations no known precautions/limitations  -GARDENIA       Subjective Pain    Able to rate subjective pain? yes  -GARDENIA    Pre-Treatment Pain Level 4  rates 3-4/10  -GARDENIA          User Key  (r) = Recorded By, (t) = Taken By, (c) = Cosigned By    Initials Name Provider Type    Rosemary Alston PTA Physical Therapist Assistant                             PT Assessment/Plan     Row Name 22 0900          PT Assessment    Assessment Comments Pt reports only 10-15% improvemetn since SOC with pain remaining in lower back and has been unchanged.  Reports some inconsistancy as of late with HEP, but normally daily compliance. Demonstrates proper log roll without cues this date.  -GARDENIA            PT Plan    Predicted Duration of Therapy Intervention (PT) 1 more visit  -GARDENIA     PT Plan Comments Next visit ergo training and DC with I HEP  -GARDENIA           User Key  (r) = Recorded By, (t) = Taken By, (c) = Cosigned By    Initials Name Provider Type    Rosemary Alston PTA Physical Therapist  "Assistant                   OP Exercises     Row Name 08/08/22 0900             Subjective Comments    Subjective Comments Staets her hips feel more tight today due to a lot of walking with grocery shopping yesterday.  -JW              Subjective Pain    Able to rate subjective pain? yes  -JW      Pre-Treatment Pain Level 4  rates 3-4/10  -JW      Post-Treatment Pain Level --  same  -JW              Exercise 1    Exercise Name 1 Pro II LE bike for posture re ed, strength and endurance  -JW      Time 1 10 minutes  -JW      Additional Comments L 7.0  -JW              Exercise 2    Exercise Name 2 B St. HS S  -JW      Reps 2 2  -JW      Time 2 30 seconds  -JW              Exercise 3    Exercise Name 3 B sitting piriformis S  -JW      Reps 3 2  -JW      Time 3 30 seconds  -JW              Exercise 4    Exercise Name 4 Sit to stands with LX  -JW      Reps 4 20  -JW      Time 4 5' hold  -JW              Exercise 5    Exercise Name 5 Seated PN LAQ's  -JW      Sets 5 2  -JW      Reps 5 10  -JW      Time 5 5\" hold  -JW              Exercise 6    Exercise Name 6 Bridges  -JW      Reps 6 20  -JW      Time 6 5\" hold  -JW              Exercise 7    Exercise Name 7 Tband HLM  -JW      Time 7 3 min with 5 sec hold  -JW      Additional Comments RTB  -JW              Exercise 8    Exercise Name 8 Tband Hip ABD  -JW      Time 8 3 Min with 5 sec hold  -JW            User Key  (r) = Recorded By, (t) = Taken By, (c) = Cosigned By    Initials Name Provider Type    Rosemary Alston, PTA Physical Therapist Assistant                              PT OP Goals     Row Name 08/08/22 0900          PT Short Term Goals    STG 1 I with HEP and have additions/changes by next recertification  -JW     STG 1 Progress Met  -JW     STG 2 Patient able to show proper log roll technique.  -JW     STG 2 Progress Progressing  -JW     STG 3 Patient to be more aware of posture and posture correction techniques  -JW     STG 3 Progress Met  -JW     STG 4 AROM B " "Lumbar ROT >= 75% of range  -     STG 4 Progress Met  -     STG 5 AROM lumbar extension >= 20°.  -     STG 5 Progress Met  -     STG 6 B hip flexion >= 4+/5  -     STG 6 Progress Not Met  -            Long Term Goals    LTG 1 Patient to have AROM for the lumbar spine all WNL, no increase in pain.  -     LTG 1 Progress Partially Met  -     LTG 2 B LE 5/5 with no increase in pain.  -     LTG 2 Progress Partially Met  -     LTG 3 Patient able to perform 20 Bridges with UE \"X\" over Pball with no increase in pain.  -     LTG 3 Progress Not Met  -     LTG 4 Patient able to control hyperextension present in B knees  -     LTG 4 Progress Partially Met;Ongoing  -     LTG 5 Patient able to show proper lifting technique floor to waist with no increase in pain.  -     LTG 5 Progress Ongoing  -     LTG 6 Patient able to show proper ergonomics for mopping/sweeping/vacuuming.  -     LTG 6 Progress Ongoing  -     LTG 7 I with final HEP.  -           User Key  (r) = Recorded By, (t) = Taken By, (c) = Cosigned By    Initials Name Provider Type    Rosemary Alston PTA Physical Therapist Assistant                               Time Calculation:   Start Time: 0918  Stop Time: 1000  Time Calculation (min): 42 min  Therapy Charges for Today     Code Description Service Date Service Provider Modifiers Qty    14808215930 HC PT THER PROC EA 15 MIN 8/8/2022 Rosemary Delatorre PTA GP, CQ 3    40862064320 HC PT THER SUPP EA 15 MIN 8/8/2022 Rosemary Delatorre PTA GP, CQ 1                    Rosemary Delatorre PTA  8/8/2022     "

## 2022-08-15 ENCOUNTER — HOSPITAL ENCOUNTER (OUTPATIENT)
Dept: PHYSICAL THERAPY | Facility: HOSPITAL | Age: 32
Setting detail: THERAPIES SERIES
Discharge: HOME | End: 2022-08-15

## 2022-08-15 DIAGNOSIS — M54.50 LUMBAR BACK PAIN: Primary | ICD-10-CM

## 2022-08-15 DIAGNOSIS — M54.50 CHRONIC LOW BACK PAIN WITHOUT SCIATICA, UNSPECIFIED BACK PAIN LATERALITY: ICD-10-CM

## 2022-08-15 DIAGNOSIS — G89.29 CHRONIC LOW BACK PAIN WITHOUT SCIATICA, UNSPECIFIED BACK PAIN LATERALITY: ICD-10-CM

## 2022-08-15 PROCEDURE — 97110 THERAPEUTIC EXERCISES: CPT | Performed by: PHYSICAL THERAPIST

## 2022-08-15 PROCEDURE — 97530 THERAPEUTIC ACTIVITIES: CPT | Performed by: PHYSICAL THERAPIST
